# Patient Record
Sex: FEMALE | Race: WHITE | NOT HISPANIC OR LATINO | Employment: OTHER | URBAN - METROPOLITAN AREA
[De-identification: names, ages, dates, MRNs, and addresses within clinical notes are randomized per-mention and may not be internally consistent; named-entity substitution may affect disease eponyms.]

---

## 2018-04-18 ENCOUNTER — CATARACT CONSULT (OUTPATIENT)
Dept: URBAN - METROPOLITAN AREA CLINIC 43 | Facility: CLINIC | Age: 73
End: 2018-04-18

## 2018-04-18 VITALS
HEART RATE: 55 BPM | RESPIRATION RATE: 20 BRPM | HEIGHT: 55 IN | DIASTOLIC BLOOD PRESSURE: 82 MMHG | SYSTOLIC BLOOD PRESSURE: 133 MMHG

## 2018-04-18 DIAGNOSIS — H18.59: ICD-10-CM

## 2018-04-18 DIAGNOSIS — H35.363: ICD-10-CM

## 2018-04-18 DIAGNOSIS — H25.811: ICD-10-CM

## 2018-04-18 DIAGNOSIS — H25.812: ICD-10-CM

## 2018-04-18 DIAGNOSIS — H18.51: ICD-10-CM

## 2018-04-18 PROCEDURE — 92286 ANT SGM IMG I&R SPECLR MIC: CPT

## 2018-04-18 PROCEDURE — 4040F PNEUMOC VAC/ADMIN/RCVD: CPT

## 2018-04-18 PROCEDURE — G8952 PRE-HTN/HTN, NO F/U, NOT GVN: HCPCS

## 2018-04-18 PROCEDURE — 1036F TOBACCO NON-USER: CPT

## 2018-04-18 PROCEDURE — 99204 OFFICE O/P NEW MOD 45 MIN: CPT

## 2018-04-18 PROCEDURE — G8427 DOCREV CUR MEDS BY ELIG CLIN: HCPCS

## 2018-04-18 PROCEDURE — 92136TC INTERFEROMETRY - TECHNICAL COMPONENT

## 2018-04-18 PROCEDURE — G8482 FLU IMMUNIZE ORDER/ADMIN: HCPCS

## 2018-04-18 PROCEDURE — 92025-2 CORNEAL TOPOGRAPHY, PT

## 2018-04-18 PROCEDURE — 92134 CPTRZ OPH DX IMG PST SGM RTA: CPT

## 2018-04-18 RX ORDER — DUREZOL 0.5 MG/ML: 1 EMULSION OPHTHALMIC TWICE A DAY

## 2018-04-18 RX ORDER — MOXIFLOXACIN HYDROCHLORIDE 5 MG/ML: 1 SOLUTION/ DROPS OPHTHALMIC

## 2018-04-18 RX ORDER — NEPAFENAC 3 MG/ML: 1 SUSPENSION/ DROPS OPHTHALMIC ONCE A DAY

## 2018-04-18 ASSESSMENT — VISUAL ACUITY
OD_SC: <J12
OD_CC: 20/40
OS_BAT: 20/200
OS_SC: >J12
OS_SC: 20/400
OS_CC: 20/40
OD_SC: 20/300
OD_PAM: 20/20
OD_CC: J2
OD_BAT: 20/200
OS_CC: J2
OS_AM: 20/20

## 2018-04-18 ASSESSMENT — TONOMETRY
OS_IOP_MMHG: 15
OD_IOP_MMHG: 14

## 2018-05-14 ENCOUNTER — TECH ONLY (OUTPATIENT)
Dept: URBAN - METROPOLITAN AREA CLINIC 39 | Facility: CLINIC | Age: 73
End: 2018-05-14

## 2018-05-14 ENCOUNTER — SURGERY/PROCEDURE (OUTPATIENT)
Dept: URBAN - METROPOLITAN AREA CLINIC 43 | Facility: CLINIC | Age: 73
End: 2018-05-14

## 2018-05-14 DIAGNOSIS — Z96.1: ICD-10-CM

## 2018-05-14 DIAGNOSIS — H25.812: ICD-10-CM

## 2018-05-14 PROCEDURE — G8482 FLU IMMUNIZE ORDER/ADMIN: HCPCS

## 2018-05-14 PROCEDURE — 66999LNSR LENSAR LASER FOR CAT SX

## 2018-05-14 PROCEDURE — G8785 BP SCRN NO PERF AT INTERVAL: HCPCS

## 2018-05-14 PROCEDURE — 6698454AV REMOVE CATARACT, INSERT ADVANCED LENS (SX ONLY)

## 2018-05-14 PROCEDURE — G8428 CUR MEDS NOT DOCUMENT: HCPCS

## 2018-05-14 PROCEDURE — 65772LRI LRI DURING CAT SX

## 2018-05-14 PROCEDURE — 4040F PNEUMOC VAC/ADMIN/RCVD: CPT

## 2018-05-14 PROCEDURE — 99211T TECH SERVICE

## 2018-05-14 PROCEDURE — 1036F TOBACCO NON-USER: CPT

## 2018-05-14 ASSESSMENT — VISUAL ACUITY: OS_SC: 20/40

## 2018-05-14 ASSESSMENT — TONOMETRY: OS_IOP_MMHG: 14

## 2018-05-29 ENCOUNTER — SURGERY/PROCEDURE (OUTPATIENT)
Dept: URBAN - METROPOLITAN AREA CLINIC 43 | Facility: CLINIC | Age: 73
End: 2018-05-29

## 2018-05-29 ENCOUNTER — PREPPED CHART (OUTPATIENT)
Dept: URBAN - METROPOLITAN AREA CLINIC 39 | Facility: CLINIC | Age: 73
End: 2018-05-29

## 2018-05-29 ENCOUNTER — ESTABLISHED PATIENT (OUTPATIENT)
Dept: URBAN - METROPOLITAN AREA CLINIC 39 | Facility: CLINIC | Age: 73
End: 2018-05-29

## 2018-05-29 DIAGNOSIS — H25.811: ICD-10-CM

## 2018-05-29 PROCEDURE — 92012 INTRM OPH EXAM EST PATIENT: CPT

## 2018-05-29 PROCEDURE — 66999LNSR LENSAR LASER FOR CAT SX

## 2018-05-29 PROCEDURE — G8428 CUR MEDS NOT DOCUMENT: HCPCS

## 2018-05-29 PROCEDURE — 6698454AV REMOVE CATARACT, INSERT ADVANCED LENS (SX ONLY)

## 2018-05-29 PROCEDURE — 1036F TOBACCO NON-USER: CPT

## 2018-05-29 PROCEDURE — G8785 BP SCRN NO PERF AT INTERVAL: HCPCS

## 2018-05-29 ASSESSMENT — VISUAL ACUITY
OD_SC: 20/400
OD_PAM: 20/20
OD_BAT: 20/200
OD_SC: <J12
OS_SC: J4 @ 20"
OS_SC: 20/20-2

## 2018-05-29 ASSESSMENT — TONOMETRY
OS_IOP_MMHG: 13
OD_IOP_MMHG: 14

## 2018-12-19 ENCOUNTER — APPOINTMENT (RX ONLY)
Dept: URBAN - METROPOLITAN AREA CLINIC 151 | Facility: CLINIC | Age: 73
Setting detail: DERMATOLOGY
End: 2018-12-19

## 2018-12-19 DIAGNOSIS — L90.5 SCAR CONDITIONS AND FIBROSIS OF SKIN: ICD-10-CM

## 2018-12-19 DIAGNOSIS — D22 MELANOCYTIC NEVI: ICD-10-CM

## 2018-12-19 DIAGNOSIS — L81.4 OTHER MELANIN HYPERPIGMENTATION: ICD-10-CM

## 2018-12-19 PROBLEM — D22.0 MELANOCYTIC NEVI OF LIP: Status: ACTIVE | Noted: 2018-12-19

## 2018-12-19 PROBLEM — Z85.6 PERSONAL HISTORY OF LEUKEMIA: Status: ACTIVE | Noted: 2018-12-19

## 2018-12-19 PROCEDURE — ? COUNSELING

## 2018-12-19 PROCEDURE — 99214 OFFICE O/P EST MOD 30 MIN: CPT

## 2018-12-19 PROCEDURE — ? OTHER

## 2018-12-19 ASSESSMENT — LOCATION DETAILED DESCRIPTION DERM
LOCATION DETAILED: LEFT LOWER CUTANEOUS LIP
LOCATION DETAILED: LEFT INFERIOR CENTRAL MALAR CHEEK

## 2018-12-19 ASSESSMENT — LOCATION ZONE DERM
LOCATION ZONE: LIP
LOCATION ZONE: FACE

## 2018-12-19 ASSESSMENT — LOCATION SIMPLE DESCRIPTION DERM
LOCATION SIMPLE: LEFT LIP
LOCATION SIMPLE: LEFT CHEEK

## 2018-12-19 NOTE — PROCEDURE: OTHER
Detail Level: Zone
Note Text (......Xxx Chief Complaint.): This diagnosis correlates with the
Other (Free Text): Patient states she has had that scar for years,  but advised to patient with volume loss it is casting a shadow near the lip. Advised to patient filler will help bring volume to the area but it is an out of pocket cost.
Other (Free Text): Advised to patient before fading away brown spots, she will need to be diligent with sunscreen. The lesion will reappear if it comes in contact with the sun. Discussed with patient using retinoids or hydroquinone. With retinoids, it helps with cell turnover and it can make the patient very dry. Advised to patient she will need to keep the skin moisturized while on it. Discussed with patient with hydroquinone, it is just bleaching the skin so it is not as drying. Patient states he would like to have the ZO melamin and refissa. Patient will purchase today.

## 2019-11-13 ENCOUNTER — ESTABLISHED COMPREHENSIVE EXAM (OUTPATIENT)
Dept: URBAN - METROPOLITAN AREA CLINIC 46 | Facility: CLINIC | Age: 74
End: 2019-11-13

## 2019-11-13 DIAGNOSIS — H18.51: ICD-10-CM

## 2019-11-13 DIAGNOSIS — H26.493: ICD-10-CM

## 2019-11-13 DIAGNOSIS — H18.59: ICD-10-CM

## 2019-11-13 PROCEDURE — 92014 COMPRE OPH EXAM EST PT 1/>: CPT

## 2019-11-13 PROCEDURE — 92015 DETERMINE REFRACTIVE STATE: CPT

## 2019-11-13 RX ORDER — CYCLOSPORINE 0.5 MG/ML: 1 EMULSION OPHTHALMIC TWICE A DAY

## 2019-11-13 ASSESSMENT — VISUAL ACUITY
OD_SC: 20/40-1
OD_SC: J1
OS_PH: 20/30
OD_BAT: 20/>400
OS_SC: 20/40-1
OS_BAT: 20/>400
OS_SC: J2

## 2019-11-13 ASSESSMENT — TONOMETRY
OD_IOP_MMHG: 14
OS_IOP_MMHG: 13